# Patient Record
Sex: MALE
[De-identification: names, ages, dates, MRNs, and addresses within clinical notes are randomized per-mention and may not be internally consistent; named-entity substitution may affect disease eponyms.]

---

## 2018-06-08 ENCOUNTER — HOSPITAL ENCOUNTER (EMERGENCY)
Dept: HOSPITAL 42 - ED | Age: 63
Discharge: HOME | End: 2018-06-08
Payer: COMMERCIAL

## 2018-06-08 VITALS — RESPIRATION RATE: 18 BRPM

## 2018-06-08 VITALS
TEMPERATURE: 98 F | HEART RATE: 91 BPM | OXYGEN SATURATION: 96 % | DIASTOLIC BLOOD PRESSURE: 81 MMHG | SYSTOLIC BLOOD PRESSURE: 124 MMHG

## 2018-06-08 VITALS — BODY MASS INDEX: 29.8 KG/M2

## 2018-06-08 DIAGNOSIS — I47.1: Primary | ICD-10-CM

## 2018-06-08 LAB
ALBUMIN SERPL-MCNC: 4.7 G/DL (ref 3–4.8)
ALBUMIN/GLOB SERPL: 1.6 {RATIO} (ref 1.1–1.8)
ALT SERPL-CCNC: 45 U/L (ref 7–56)
AST SERPL-CCNC: 31 U/L (ref 17–59)
BASOPHILS # BLD AUTO: 0.03 K/MM3 (ref 0–2)
BASOPHILS NFR BLD: 0.2 % (ref 0–3)
BUN SERPL-MCNC: 25 MG/DL (ref 7–21)
CALCIUM SERPL-MCNC: 10 MG/DL (ref 8.4–10.5)
EOSINOPHIL # BLD: 0.1 10*3/UL (ref 0–0.7)
EOSINOPHIL NFR BLD: 0.7 % (ref 1.5–5)
ERYTHROCYTE [DISTWIDTH] IN BLOOD BY AUTOMATED COUNT: 13.1 % (ref 11.5–14.5)
GFR NON-AFRICAN AMERICAN: > 60
GRANULOCYTES # BLD: 9.97 10*3/UL (ref 1.4–6.5)
GRANULOCYTES NFR BLD: 67.6 % (ref 50–68)
HGB BLD-MCNC: 16.4 G/DL (ref 14–18)
LYMPHOCYTES # BLD: 3.1 10*3/UL (ref 1.2–3.4)
LYMPHOCYTES NFR BLD AUTO: 21.2 % (ref 22–35)
MCH RBC QN AUTO: 29.8 PG (ref 25–35)
MCHC RBC AUTO-ENTMCNC: 34.5 G/DL (ref 31–37)
MCV RBC AUTO: 86.4 FL (ref 80–105)
MONOCYTES # BLD AUTO: 1.5 10*3/UL (ref 0.1–0.6)
MONOCYTES NFR BLD: 10.3 % (ref 1–6)
PLATELET # BLD: 367 10^3/UL (ref 120–450)
PMV BLD AUTO: 10.8 FL (ref 7–11)
RBC # BLD AUTO: 5.5 10^6/UL (ref 3.5–6.1)
TROPONIN I SERPL-MCNC: < 0.01 NG/ML
WBC # BLD AUTO: 14.7 10^3/UL (ref 4.5–11)

## 2018-06-08 PROCEDURE — 71045 X-RAY EXAM CHEST 1 VIEW: CPT

## 2018-06-08 PROCEDURE — 83735 ASSAY OF MAGNESIUM: CPT

## 2018-06-08 PROCEDURE — 80053 COMPREHEN METABOLIC PANEL: CPT

## 2018-06-08 PROCEDURE — 83615 LACTATE (LD) (LDH) ENZYME: CPT

## 2018-06-08 PROCEDURE — 93005 ELECTROCARDIOGRAM TRACING: CPT

## 2018-06-08 PROCEDURE — 85025 COMPLETE CBC W/AUTO DIFF WBC: CPT

## 2018-06-08 PROCEDURE — 99283 EMERGENCY DEPT VISIT LOW MDM: CPT

## 2018-06-08 PROCEDURE — 96374 THER/PROPH/DIAG INJ IV PUSH: CPT

## 2018-06-08 PROCEDURE — 84484 ASSAY OF TROPONIN QUANT: CPT

## 2018-06-08 PROCEDURE — 82550 ASSAY OF CK (CPK): CPT

## 2018-06-08 NOTE — ED PDOC
Arrival/HPI





- General


Historian: Patient





- History of Present Illness


Time/Duration: 1-3 hours


Symptom Onset: Sudden


Symptom Course: Unchanged


Quality: Tightness


Severity Level: 9


Context: Work





<Ruddy Escobar - Last Filed: 06/08/18 16:26>





- History of Present Illness


Activities at Onset: Rest





<Nader Mendez - Last Filed: 06/08/18 17:11>





- General


Chief Complaint: Chest Pain


Time Seen by Provider: 06/08/18 14:27





- History of Present Illness


Narrative History of Present Illness (Text): 


63 year old male presents with chest tightness and palpitations. Patient states 

he was at work around noon, 3 hours ago, when he felt tightness in his chest 

followed by palpitations. He stated he had some SOB, no syncope. Patient denies 

fever, chills, abdominal pain, or any other complaints at this time. 


06/08/18 15:30


 (Ruddy Escobar)





Past Medical History





- Provider Review


Nursing Documentation Reviewed: Yes





- Infectious Disease


Hx of Infectious Diseases: None





<Ruddy Escobar - Last Filed: 06/08/18 16:26>





- Travel History


Have you recently traveled outside [place] w/in the past 3 mons?: No





- Past History


Past History: No Previous





<Nader Mendez - Last Filed: 06/08/18 17:11>





Family/Social History





- Physician Review


Nursing Documentation Reviewed: Yes


Family/Social History: No Known Family HX





<Ruddy Escobar - Last Filed: 06/08/18 16:26>


Smoking Status: Unknown If Ever Smoked


Hx Alcohol Use: No


Hx Substance Use: No


Hx Substance Use Treatment: No





<Nader Mendez - Last Filed: 06/08/18 17:11>





Allergies/Home Meds





<Ruddy Escobar - Last Filed: 06/08/18 16:26>





<Nader Mendez - Last Filed: 06/08/18 17:11>


Allergies/Adverse Reactions: 


Allergies





nitroglycerin Adverse Reaction (Verified 06/08/18 14:35)


 ANAPHYLAXIS











Review of Systems





- Review of Systems


Constitutional: Normal


Eyes: Normal


ENT: Normal


Respiratory: Normal


Cardiovascular: Palpitations, Other (tightness)


Gastrointestinal: Normal


Genitourinary Male: Normal


Musculoskeletal: Normal


Skin: Normal


Neurological: Normal


Endocrine: Normal





<Ruddy Escobar - Last Filed: 06/08/18 16:26>





- Review of Systems


Hemo/Lymphatic: Normal


Psychiatric: Normal





<Nader Mendez - Last Filed: 06/08/18 17:11>





Physical Exam


Temperature: Afebrile


Blood Pressure: Normal


Pulse: Tachycardic


Respiratory Rate: Normal


Appearance: Positive for: Well-Appearing, Non-Toxic


Pain Distress: Moderate


Mental Status: Positive for: Alert and Oriented X 3





- Systems Exam


Head: Present: Atraumatic, Normocephalic


Pupils: Present: PERRL


Extroacular Muscles: Present: EOMI


Conjunctiva: Present: Normal


Mouth: Present: Moist Mucous Membranes


Neck: Present: Normal Range of Motion


Respiratory/Chest: Present: Clear to Auscultation, Good Air Exchange


Cardiovascular: Present: Tachycardic


Abdomen: Present: Normal Bowel Sounds.  No: Tenderness, Distention


Lower Extremity: No: Edema


Neurological: Present: GCS=15, CN II-XII Intact


Skin: Present: Warm





<Ruddy Escobar - Last Filed: 06/08/18 16:26>


Vital Signs Reviewed: Yes





<AndreaNader - Last Filed: 06/08/18 17:11>


Vital Signs











  Temp Pulse Resp BP Pulse Ox


 


 06/08/18 16:41  98 F  91 H  18  124/81  96


 


 06/08/18 15:18   94 H  18  129/83  99


 


 06/08/18 14:48   108 H   126/83 


 


 06/08/18 14:27  98.2 F  180 H  18  126/82  98














Medical Decision Making





- Lab Interpretations


I have reviewed the lab results: Yes





<Ruddy Escobar - Last Filed: 06/08/18 16:26>


Re-evaluation Time: 16:00


Reassessment Condition: Improved





- Critical Care


Critical Care Minutes: 30 minutes


Critical Care Time: Excluding Proc Time





- Lab Interpretations


Interpretation: All labs normal





- RAD Interpretation


: Radiologist





- EKG Interpretation


Interpreted by ED Physician: Yes


Type: 12 lead EKG


Comparison: Different from prev. EKG





<AndreaNader - Last Filed: 06/08/18 17:11>


ED Course and Treatment: 


Plan


-EKG


-CBC, CMP, cardiac iso


-Adenosine IV 


-metoprolol IV


-repeat EKG


-reasses


06/08/18 15:19





Patient in sinus rhythm status post adenosine infusion 


HR in low 90s


06/08/18 16:01








 (Ruddy Escobar)





06/08/18


Patient Seen With Resident:


In agreement with resident note which contains more details about the patient. 

Patient was seen and evaluated with resident. Came up with plan and treatment 

together. 





I performed the hx and physical exam of the patient and discussed their mgt 

with the RESIDENT. I reviewed the RESIDENT's NOTE and agree with the assessment 

and plan of care.





with pt's initial arrival, IV/EKG was obtained immediately, and pt received 6mg 

of adenosine, + responded well, improvement in HR is noted almost immediately; 

will provide patient 5mg lopressor IV





pt is currently comfortable


pt is not in any distress


pt vital signs improved





06/08/18 15:27


Discussed case with  who states that if patient remains stable in 

sinus rhythm and has no further complaints with normal lab findings, then he 

can be discharged home with outpt f/u and to be placed on Metoprolol 25mg BID x 

7 days, will f/u with pt in his office next week.





1615


pt remained comfortable


vital signs remained stable


pt is made aware of his medical results


pt is encouraged fluids


pt is encouraged NO caffeine products


pt will f/u as directed


pt will be discharged home


 (Nader Mendez)





- Critical Care


Narrative Critical Care (Text): 





06/08/18 16:56


critical care time: 30min, excluding procedure time, excluding time teaching 

residents/students/mid-level providers; including initial eval/diagnosis, 

diagnostic interpretation, re-eval, consultations, final disposition (Nader Mendez)





- Lab Interpretations


Lab Results: 








 06/08/18 15:31 





 06/08/18 15:31 





 Lab Results





06/08/18 15:31: WBC 14.7 H, RBC 5.50, Hgb 16.4, Hct 47.5, MCV 86.4, MCH 29.8, 

MCHC 34.5, RDW 13.1, Plt Count 367, MPV 10.8, Gran % 67.6, Lymph % (Auto) 21.2 L

, Mono % (Auto) 10.3 H, Eos % (Auto) 0.7 L, Baso % (Auto) 0.2, Gran # 9.97 H, 

Lymph # (Auto) 3.1, Mono # (Auto) 1.5 H, Eos # (Auto) 0.1, Baso # (Auto) 0.03


06/08/18 15:31: Sodium 144, Potassium 3.9, Chloride 106, Carbon Dioxide 24, 

Anion Gap 19, BUN 25 H, Creatinine 1.1, Est GFR (African Amer) > 60, Est GFR (

Non-Af Amer) > 60, Random Glucose 111 H, Calcium 10.0, Magnesium 2.0, Total 

Bilirubin 0.5, AST 31, ALT 45, Alkaline Phosphatase 77, Lactate Dehydrogenase 

404, Total Creatine Kinase 84, Troponin I < 0.01, Total Protein 7.7, Albumin 4.7

, Globulin 3.0, Albumin/Globulin Ratio 1.6











- RAD Interpretation


Narrative RAD Interpretations (Text): 





06/08/18 16:57


HISTORY:


palpitations  





COMPARISON:


No prior. 





FINDINGS:





LUNGS:


No active pulmonary disease.





PLEURA:


No significant pleural effusion identified, no pneumothorax apparent.





CARDIOVASCULAR:


Normal.





OSSEOUS STRUCTURES:


No significant abnormalities.





VISUALIZED UPPER ABDOMEN:


Normal.





OTHER FINDINGS:


None.





IMPRESSION:


No active disease. (Nader Mendez)


Radiology Orders: 








06/08/18 15:25


CHEST PORTABLE [RAD] Stat 














- EKG Interpretation


EKG Interpretation (Text): 





06/08/18 16:59


EKG1: SVT at 180 bpm, normal axis, no ectopy, non-specific st-t changes, ABNL 

EKG; 





after 6 of adenosine


EKG2: Sinus tach at 115bpm, normal axis, + frequent PVCs, non-specific st-t 

changes, ABNL EKG;  (Nader Mendez)





- Medication Orders


Current Medication Orders: 











Discontinued Medications





Aspirin (Aspirin)  325 mg PO STAT STA


   Stop: 06/08/18 15:09


   Last Admin: 06/08/18 15:15  Dose: 325 mg





Sodium Chloride (Sodium Chloride 0.9%)  1,000 mls @ 100 mls/hr IV .Q10H [facility]


   Last Admin: 06/08/18 15:18  Dose: 100 mls/hr





eMAR Start Stop


 Document     06/08/18 15:18  SRE  (Rec: 06/08/18 15:18  SRE  [id number])


     Intravenous Solution


      Start Date                                 06/08/18


      Start Time                                 15:18





Metoprolol Tartrate (Lopressor)  5 mg IVP STAT STA


   Stop: 06/08/18 14:39


   Last Admin: 06/08/18 14:48  Dose: 5 mg





IVP Administration


 Document     06/08/18 14:48  SRE  (Rec: 06/08/18 14:49  SRE  [id number])


     Charges for Administration


      # of IVP Administrations                   1


MAR Pulse and Blood Pressure


 Document     06/08/18 14:48  SRE  (Rec: 06/08/18 14:49  SRE  [id number])


     Pulse


      Pulse Rate (60-90)                         108


     Blood Pressure


      Blood Pressure (100//90)             126/83














Disposition/Present on Arrival





- Present on Arrival


Any Indicators Present on Arrival: No


History of DVT/PE: No


History of Uncontrolled Diabetes: No


Urinary Catheter: No


History of Decub. Ulcer: No


History Surgical Site Infection Following: None





- Disposition


Have Diagnosis and Disposition been Completed?: Yes


Disposition Time: 16:25





<Ruddy Escobar - Last Filed: 06/08/18 16:26>





<Nader Mendez - Last Filed: 06/08/18 17:11>





- Disposition


Diagnosis: 


 SVT (supraventricular tachycardia)





Disposition: HOME/ ROUTINE


Condition: IMPROVED


Discharge Instructions (ExitCare):  Supraventricular Tachycardia (SVT)


Print Language: ENGLISH


Additional Instructions: 


Please keep log of your blood pressure


Avoid caffeine products 


Follow up with Dr. Fontanez in the office (next week)


Please take new medication metoprolol 25 twice a day. 





Prescriptions: 


Metoprolol Tartrate [Lopressor] 25 mg PO BID #14 tab


Referrals: 


 Service [Outside] - Follow up with primary


[facility] [place] [Outside] - Follow up with primary


[facility] [Outside] - Follow up with primary


[facility], [Non-Staff] - Follow up with primary


Gm Coronado MD [Staff Provider] - Follow up with primary


Forms:  [facility] (English)

## 2018-06-08 NOTE — RAD
HISTORY:

palpitations  



COMPARISON:

No prior. 



FINDINGS:



LUNGS:

No active pulmonary disease.



PLEURA:

No significant pleural effusion identified, no pneumothorax apparent.



CARDIOVASCULAR:

Normal.



OSSEOUS STRUCTURES:

No significant abnormalities.



VISUALIZED UPPER ABDOMEN:

Normal.



OTHER FINDINGS:

None.



IMPRESSION:

No active disease.

## 2018-06-09 NOTE — CARD
--------------- APPROVED REPORT --------------





EKG Measurement

Heart [id number]

MT [id number]

[id number]

[id number]

[id number]



<Conclusion>

Sinus tachycardia with frequent PVCs

## 2018-06-09 NOTE — CARD
--------------- APPROVED REPORT --------------





EKG Measurement

Heart [id number]

[id number]

[id number]

[id number]



<Conclusion>

Supraventricular tachycardia

STTW changes

## 2023-06-30 ENCOUNTER — NEW PATIENT (OUTPATIENT)
Dept: URBAN - METROPOLITAN AREA CLINIC 21 | Facility: CLINIC | Age: 68
End: 2023-06-30

## 2023-06-30 DIAGNOSIS — H43.393: ICD-10-CM

## 2023-06-30 DIAGNOSIS — H25.13: ICD-10-CM

## 2023-06-30 PROCEDURE — 92250 FUNDUS PHOTOGRAPHY W/I&R: CPT

## 2023-06-30 PROCEDURE — 92020 GONIOSCOPY: CPT

## 2023-06-30 PROCEDURE — 92004 COMPRE OPH EXAM NEW PT 1/>: CPT

## 2023-06-30 ASSESSMENT — VISUAL ACUITY
OS_CC: 20/25-3
OD_CC: 20/30+1
OU_CC: J1+

## 2023-06-30 ASSESSMENT — TONOMETRY
OS_IOP_MMHG: 10
OD_IOP_MMHG: 12

## 2025-01-10 ENCOUNTER — ESTABLISHED COMPREHENSIVE EXAM (OUTPATIENT)
Dept: URBAN - METROPOLITAN AREA CLINIC 21 | Facility: CLINIC | Age: 70
End: 2025-01-10

## 2025-01-10 DIAGNOSIS — H52.203: ICD-10-CM

## 2025-01-10 DIAGNOSIS — H25.13: ICD-10-CM

## 2025-01-10 DIAGNOSIS — H52.03: ICD-10-CM

## 2025-01-10 DIAGNOSIS — H52.4: ICD-10-CM

## 2025-01-10 DIAGNOSIS — H00.14: ICD-10-CM

## 2025-01-10 DIAGNOSIS — H43.393: ICD-10-CM

## 2025-01-10 PROCEDURE — 92250 FUNDUS PHOTOGRAPHY W/I&R: CPT

## 2025-01-10 PROCEDURE — 92014 COMPRE OPH EXAM EST PT 1/>: CPT

## 2025-01-10 PROCEDURE — 92020 GONIOSCOPY: CPT

## 2025-01-10 PROCEDURE — 92015 DETERMINE REFRACTIVE STATE: CPT

## 2025-01-10 ASSESSMENT — VISUAL ACUITY
OS_CC: 20/25-2
OD_CC: J1
OD_CC: 20/30-2
OS_CC: J3

## 2025-01-10 ASSESSMENT — TONOMETRY
OS_IOP_MMHG: 13
OD_IOP_MMHG: 14